# Patient Record
Sex: MALE | Race: WHITE
[De-identification: names, ages, dates, MRNs, and addresses within clinical notes are randomized per-mention and may not be internally consistent; named-entity substitution may affect disease eponyms.]

---

## 2019-04-27 ENCOUNTER — HOSPITAL ENCOUNTER (EMERGENCY)
Dept: HOSPITAL 25 - UCCORT | Age: 69
Discharge: HOME | End: 2019-04-27
Payer: MEDICARE

## 2019-04-27 VITALS — SYSTOLIC BLOOD PRESSURE: 145 MMHG | DIASTOLIC BLOOD PRESSURE: 60 MMHG

## 2019-04-27 DIAGNOSIS — J02.9: Primary | ICD-10-CM

## 2019-04-27 DIAGNOSIS — Z87.891: ICD-10-CM

## 2019-04-27 DIAGNOSIS — J32.9: ICD-10-CM

## 2019-04-27 DIAGNOSIS — R05: ICD-10-CM

## 2019-04-27 DIAGNOSIS — R09.81: ICD-10-CM

## 2019-04-27 PROCEDURE — G0463 HOSPITAL OUTPT CLINIC VISIT: HCPCS

## 2019-04-27 PROCEDURE — 99212 OFFICE O/P EST SF 10 MIN: CPT

## 2019-04-27 NOTE — UC
Throat Pain/Nasal Chalino HPI





- HPI Summary


HPI Summary: 





SORE THROAT, SINUS CONGESTION, AND COUGH X10 DAY





- History of Current Complaint


Chief Complaint: UCGeneralIllness


Stated Complaint: SORE THROAT, COUGH


Time Seen by Provider: 04/27/19 11:01


Hx Obtained From: Patient


Onset/Duration: Sudden Onset, Lasting Days


Severity: Mild


Pain Intensity: 0


Associated Signs & Symptoms: Positive: Dysphagia, Sinus Discomfort, Nasal 

Discharge





- Allergies/Home Medications


Allergies/Adverse Reactions: 


 Allergies











Allergy/AdvReac Type Severity Reaction Status Date / Time


 


No Known Allergies Allergy   Verified 04/27/19 11:05














PMH/Surg Hx/FS Hx/Imm Hx


Previously Healthy: Yes





- Surgical History


Surgical History: Yes


Surgery Procedure, Year, and Place: left inguinal hernia repair 2009, right 

ankle fx repair





- Family History


Known Family History: Positive: Hypertension





- Social History


Alcohol Use: None


Alcohol Amount: not in 7 years


Substance Use Type: None


Smoking Status (MU): Former Smoker





Review of Systems


All Other Systems Reviewed And Are Negative: Yes


Constitutional: Positive: Fatigue


ENT: Positive: Sore Throat, Nasal Discharge, Sinus Congestion


Respiratory: Positive: Cough


Is Patient Immunocompromised?: No





Physical Exam


Triage Information Reviewed: Yes


Appearance: Well-Nourished, Ill-Appearing, Pain Distress


Vital Signs: 


 Initial Vital Signs











Temp  99.8 F   04/27/19 11:02


 


Pulse  111   04/27/19 11:02


 


Resp  20   04/27/19 11:02


 


BP  145/60   04/27/19 11:02


 


Pulse Ox  97   04/27/19 11:02











Vital Signs Reviewed: Yes


Eye Exam: Normal


ENT: Positive: Pharyngeal erythema - wiht PND, Nasal congestion, Nasal drainage

, TM bulging


Dental Exam: Normal


Neck exam: Normal


Respiratory: Positive: Chest non-tender, Lungs clear, Normal breath sounds


Cardiovascular Exam: Normal


Cardiovascular: Positive: No Murmur, Pulses Normal, Tachycardia


Abdominal Exam: Normal


Abdomen Description: Positive: Nontender, No Organomegaly, Soft


Bowel Sounds: Positive: Present


Musculoskeletal Exam: Normal


Neurological Exam: Normal


Psychological Exam: Normal


Skin Exam: Normal





Throat Pain/Nasal Course/Dx





- Course


Course Of Treatment: 





hx obtained, exam performed ,meds reviewed, treated for sinusitis 





- Differential Dx/Diagnosis


Differential Diagnosis/HQI/PQRI: Otitis Media, Pharyngitis, Sinusitis, URI


Provider Diagnosis: 


 Pharyngitis, Sinusitis








Discharge





- Sign-Out/Discharge


Documenting (check all that apply): Patient Departure


All imaging exams completed and their final reports reviewed: No Studies





- Discharge Plan


Condition: Stable


Disposition: HOME


Prescriptions: 


Amoxicillin PO (*) [Amoxicillin 875 MG (*)] 875 mg PO BID #20 tab


Patient Education Materials:  Sinusitis (ED)


Referrals: 


Lana Brian MD [Primary Care Provider] - 


Additional Instructions: 


1. take the medication as prescribed.


2. Rest and get plenty of fluids


3. Salt water gargles multiple times a day


4. follow up with your doctor if not improving with treatment





- Billing Disposition and Condition


Condition: STABLE


Disposition: Home

## 2019-07-17 NOTE — HP
*** AMENDED REPORT NOW INCLUDES DESIGNATED COSIGNER ***



PREOPERATIVE HISTORY AND PHYSICAL:

 

DATE OF ADMISSION:  07/30/19

 

DATE OF OFFICE VISIT/ENCOUNTER:  07/10/19

 

ATTENDING PHYSICIAN:  Betina Brownlee MD.* (DICTATED BY CARMELA SANCHEZ)

 

PROCEDURE:  Right wrist carpal tunnel release, extensor tenosynovectomy.

 

HISTORY OF PRESENT ILLNESS:  This is a 68-year-old male who has had ongoing 
symptoms of right carpal tunnel syndrome and tenosynovitis of the extensor 
tendons of the right wrist.  Symptoms have been ongoing for nearly a year.  He 
had a nerve conduction study which showed severe carpal tunnel syndrome 
bilaterally.  As far as the tenosynovitis of the extensor tendons are concerned 
he has failed conservative treatment including rest and cortisone injections.  
He has now consented to proceed with surgical intervention for both of these 
problems.  His cardiologist is Dr. Hahn and we will receive clearance from 
him prior to proceeding with surgery. The patient is on Plavix and we will plan 
on having him stop taking that prior to surgery per Dr. Hahn's 
instructions.

 

PAST MEDICAL HISTORY:

1.  COPD.

2.  Depression/anxiety.

3.  History of TIA.

4.  GERD.

5.  History of basal cell carcinoma.

6.  BPH.

7.  Questionable MRSA infection after right ankle surgery in 2015.

 

PAST SURGICAL HISTORY:

1.  Right ankle surgery in 2015.

2.  Basal cell carcinoma removal.

3.  Hernia repair.

 

CURRENT MEDICATIONS:

1.  Aspirin 81 mg daily.

2.  Clopidogrel bisulfate 75 mg daily.

3.  Lyrica 50 mg twice a day.

4.  Magnesium oxide 400 mg daily.

5.  Omeprazole 40 mg daily.

6.  Spiriva HandiHaler 18 mcg 1 unit inhalation daily.

7.  Symbicort 2 puffs twice a day.

8.  Tamsulosin HCl 0.4 mg daily.

9.  Trazodone HCl daily.

10.  Ventolin inhaler 2 puffs 4 times a day p.r.n.

11.  Zoloft 50 mg daily.

 

ALLERGIES:  PENICILLIN, reaction unknown.

 

FAMILY MEDICAL HISTORY:  Heart disease, hypertension, stroke, cancer.

 

SOCIAL HISTORY:  The patient is retired, he previously worked in a lumbar yard.
  He is a former smoker.  He quit 4 years ago.  Prior to that he smoked up to 2 
packs per day since age 13.  He denies recreational drug use.  He drinks 
alcohol on occasion.

 

REVIEW OF SYSTEMS:  Negative for general, cephalic, cardiovascular, respiratory
, GI, , other musculoskeletal, integumentary, endocrine, neurologic, and 
hematologic symptoms.  Infectious Diseases:  Positive for history of MRSA in 
2015. Negative for hepatitis C, HIV.

 

                               PHYSICAL EXAMINATION

 

GENERAL:  Well-developed, well-nourished 68-year-old male, in no acute distress.

 

VITAL SIGNS:  Height 5 feet 11 inches, weight 184 pounds, pulse rate 86, blood 
pressure 130/68.

 

HEENT:  Normocephalic and atraumatic.  Pupils are equal, round, and reactive to 
light and accommodation.  Extraocular movements are intact.  Throat is clear.

 

NECK:  Supple.  No palpable lymph nodes.

 

PULMONARY:  Lungs are clear to auscultation bilaterally.  No wheezes, rales, or 
rhonchi.

 

CARDIOVASCULAR:  Regular rate and rhythm.  S1 and S2.  No murmurs, rubs, or 
gallops.  No edema.

 

ABDOMEN:  Positive bowel sounds.  Soft and nontender.

 

NEUROLOGIC:  Alert and oriented x3.  Cranial nerves II through XII are intact.

 

MUSCULOSKELETAL:  On exam of his right hand he has some tenosynovitis on the 
dorsum of the wrist at the third and fourth extensor compartments.  He has 
decreased sensation in the medial nerve distribution.  There is significant 
weakness with thumb abduction and some first dorsal interosseous wasting.  He 
has some obvious degenerative changes in his fingers.

 

 IMAGING STUDIES:  X-rays AP, lateral, and oblique at the right wrist show 
thumb CMC arthritis.  Otherwise minimal to mild degenerative changes.  EMG 
nerve conduction studies showed severe right carpal tunnel syndrome.

 

IMPRESSION:  Right wrist extensor tenosynovitis, right carpal tunnel syndrome.

 

PLAN/RECOMMENDATIONS:  The patient is scheduled to undergo a right wrist carpal 
tunnel release, extensor tenosynovectomy on 07/30/19 with Dr. Brownlee.  He will 
return to the office initially 2 days postop for drain removal and then 10 days 
postop for suture removal.  A prescription for Norco was e-scribed to the 
patient's pharmacy for post-operative pain management.

 

 ____________________________________ CARMELA SANCHEZ

 

[id number]/CPS #: [id number]

GEOFFREY

## 2019-07-30 ENCOUNTER — HOSPITAL ENCOUNTER (OUTPATIENT)
Dept: HOSPITAL 25 - OREAST | Age: 69
Discharge: HOME | End: 2019-07-30
Attending: ORTHOPAEDIC SURGERY
Payer: MEDICARE

## 2019-07-30 VITALS — DIASTOLIC BLOOD PRESSURE: 68 MMHG | SYSTOLIC BLOOD PRESSURE: 112 MMHG

## 2019-07-30 DIAGNOSIS — J44.9: ICD-10-CM

## 2019-07-30 DIAGNOSIS — Z86.73: ICD-10-CM

## 2019-07-30 DIAGNOSIS — F41.8: ICD-10-CM

## 2019-07-30 DIAGNOSIS — K21.9: ICD-10-CM

## 2019-07-30 DIAGNOSIS — G56.01: Primary | ICD-10-CM

## 2019-07-30 DIAGNOSIS — I25.2: ICD-10-CM

## 2019-07-30 DIAGNOSIS — M65.831: ICD-10-CM

## 2019-07-30 DIAGNOSIS — L40.9: ICD-10-CM

## 2019-07-30 DIAGNOSIS — I25.10: ICD-10-CM

## 2019-07-30 DIAGNOSIS — R91.1: ICD-10-CM

## 2019-07-30 PROCEDURE — 88304 TISSUE EXAM BY PATHOLOGIST: CPT

## 2019-07-30 NOTE — OP
DATE OF OPERATION:  07/30/19 - [facility]

 

DATE OF BIRTH:  [birth year]

 

SURGEON:  Betina Brownlee MD

 

ASSISTANT:  CARMELA Mckeon

 

ANESTHESIA:  IV regional.

 

PRE-OP DIAGNOSES:  Right carpal tunnel syndrome and extensor tenosynovitis.

 

POST-OP DIAGNOSES:  Right carpal tunnel syndrome and extensor tenosynovitis.

 

OPERATIVE PROCEDURE:  Right carpal tunnel release and extensor tenosynovitis in 
4 extensor compartments.

 

ESTIMATED BLOOD LOSS:  Zero.

 

TOURNIQUET TIME:  About 35 minutes.

 

INDICATION FOR PROCEDURE:  Tito is a 69-year-old man who has swelling on the 
dorsal aspect of his right wrist as well as numbness and tingling in the median 
nerve distribution of his right hand.  Nerve conduction study shows severe 
carpal tunnel syndrome.  He has failed conservative treatment for both problems
, presents for extensor tenosynovectomy and carpal tunnel release.

 

DESCRIPTION OF PROCEDURE:  The patient was brought to the operating room, was 
given a sedation anesthetic and an IV regional anesthetic with a tourniquet 
around his right forearm.  The skin of his right upper extremity was prepped 
and draped in the usual sterile fashion.  A longitudinal incision was made in 
the palm in line with the ring finger.  We dissected through the subcutaneous 
tissue down to the transverse carpal ligament.  The ligament was divided 
sharply with a knife and then more proximally with the scissors.  The nerve was 
dissected free from the surrounding tissue and there was an area of moderate 
compression at the mid portion of the ligament.  The wound was irrigated and 
the skin edges were reapproximated with 4-0 nylon suture.  Next, at the first 
dorsal compartment, a small longitudinal incision was made and a branch of the 
radial sensory nerve was located and retracted by the surgical assistant, 
Citlaly Adams.  The extensor tenosynovitis in the first compartment was 
completely excised and sent for pathology.  The wound was irrigated and the 
skin edges were reapproximated with 4-0 nylon suture.  Then, a longitudinal 
incision was made on the dorsal aspect of the wrist and we dissected down to 
the extensor retinaculum.  There was extensor tenosynovitis in the fourth, 
third and second compartments, this was all carefully dissected off of the 
tendons which were all in excellent condition.  The tenosynovitis did not 
extend all the way under the extensor retinaculum, so a portion of the 
retinaculum was left intact.  The wound was copiously irrigated with saline.  A 
Penrose drain was placed 0.25 inch and then the dorsal incision was closed in 
interrupted fashion with 4-0 nylon suture.  The wounds were all dressed with 
Xeroform, 4x4, Webril, ABD, and an Ace wrap.  The patient tolerated the 
procedure well and was brought to the recovery room in good condition.

 

 [id number]/CPS #: [id number]

MTDHORACE